# Patient Record
Sex: FEMALE | Race: WHITE | NOT HISPANIC OR LATINO | Employment: FULL TIME | URBAN - METROPOLITAN AREA
[De-identification: names, ages, dates, MRNs, and addresses within clinical notes are randomized per-mention and may not be internally consistent; named-entity substitution may affect disease eponyms.]

---

## 2023-03-01 ENCOUNTER — OFFICE VISIT (OUTPATIENT)
Dept: SURGERY | Facility: CLINIC | Age: 33
End: 2023-03-01
Payer: COMMERCIAL

## 2023-03-01 VITALS — WEIGHT: 141 LBS | HEIGHT: 66 IN | BODY MASS INDEX: 22.66 KG/M2

## 2023-03-01 DIAGNOSIS — R10.32 GROIN PAIN, LEFT: Primary | ICD-10-CM

## 2023-03-01 PROCEDURE — 99203 OFFICE O/P NEW LOW 30 MIN: CPT

## 2023-03-01 RX ORDER — NORETHINDRONE ACETATE AND ETHINYL ESTRADIOL AND FERROUS FUMARATE 1MG-20(21)
1 KIT ORAL DAILY
COMMUNITY
Start: 2023-01-23

## 2023-03-01 NOTE — PROGRESS NOTES
ASSESSMENT/PLAN:  Ms. Harsh Thayer is a 32 y.o. female with concern of a palpable lump in her left groin. On physical exam, no abnormalities appreciated, including lymphadenopathy or femoral or inguinal hernia.  Discussed with patient this could have been an acute process that has resolved with antibiotics. Also discussed differential could include inguinal or femoral hernia.  Discussed the option to proceed with an ultrasound versus continued observation as this has seemed to resolve. She states she would like to continue with observation at this time.  I will schedule her for a 2-week follow-up for repeat physical exam.  Discussed in the interim, if she decides to proceed with an ultrasound, I am happy to place the order. Should the nodule return, I have asked her to contact the office, and I would like to see her for re-examination sooner. Recommended she continue to monitor if the lump returns and if it becomes hard, has surrounding skin changes, or has increased discomfort in the area. Discussed for residual tenderness, alternate with Tylenol and Ibuprofen for pain relief.  She knows to call the office sooner with any concerns.    Referring Provider: CHRIS Logan    Chief Complaint: Lump in left groin     History of Present Illness:    Ms. Harsh Thayer is a 32 y.o. female who presents today with concern of a lump in her left groin.  She states she first noticed this about a week and a half ago. Initially, she had shooting pain. However, now it feels like a dull ache similar to menstrual cramps. The pain has improved. She saw her GYN for evaluation and was placed on antibiotics; she has completed this. Yesterday, she began to notice some tenderness in her left upper thigh. Denies any drainage from the area or skin changes.  Denies any similar symptoms in the past. She states she does go to the gym but does not feel like she strains.  She does not recall injuring the area or pulling any muscles. She  reports normal bowel movements. Today in the office, she states she is unable to palpate the lump.     Past Medical History:   History reviewed. No pertinent past medical history.     Past Surgical History:    History reviewed. No pertinent surgical history.     Family History:    History reviewed. No pertinent family history.    Social History:    Social History     Socioeconomic History   • Marital status: Significant Other   Tobacco Use   • Smoking status: Never   • Smokeless tobacco: Never   Vaping Use   • Vaping Use: Never used   Substance and Sexual Activity   • Alcohol use: Yes     Alcohol/week: 1.0 - 2.0 standard drink     Types: 1 - 2 Glasses of wine per week     Comment: socially   • Drug use: Never     Allergies:   No Known Allergies    Medications:     Current Outpatient Medications:   •  Junel FE 1/20 1-20 MG-MCG per tablet, Take 1 tablet by mouth Daily., Disp: , Rfl:     Labs:    • No recent labs    Review of Systems:   Influenza-like illness: no fever, no cough, no sore throat, no body aches, no loss of sense of taste or smell, no known exposure to person with Covid-19.  Constitutional: Negative for fevers or chills  Respiratory: Negative for cough or shortness of breath  Cardiovascular: Negative for chest pain or heart palpitations  Gastrointestinal: Positive for left groin pain. Negative for abdominal pain, nausea, vomiting, constipation, melena, or hematochezia  Genitourinary: Negative for hematuria or dysuria  Musculoskeletal: Negative for joint swelling or gait instability  Psychiatric: Negative for suicidal ideations or depression  All other systems reviewed and negative    Physical Exam:   • Constitutional: Well-developed, well-nourished, no acute distress  • ENMT: Hearing grossly normal, oral mucosa moist  • Respiratory: Normal inspiratory effort  • Cardiovascular: No peripheral edema, no jugular venous distention  • Gastrointestinal: Soft, nontender, no hernia appreciated bilaterally, no  palpable abnormalities in bilateral groin  • Lymphatics (palpable nodes): cervical-negative, axillary-negative, inguinal- negative  • Skin: Warm, dry, no rash on visualized skin surfaces  • Musculoskeletal: Symmetric strength, normal gait  • Psychiatric: Alert and oriented ×3, normal affect    Ping Wayne PA-C  General Surgery     [facility]  [address], [address]  [place] [address]  P: [phone]  F: [phone]